# Patient Record
Sex: MALE | Race: WHITE | URBAN - METROPOLITAN AREA
[De-identification: names, ages, dates, MRNs, and addresses within clinical notes are randomized per-mention and may not be internally consistent; named-entity substitution may affect disease eponyms.]

---

## 2017-06-02 ENCOUNTER — TRANSFERRED RECORDS (OUTPATIENT)
Dept: HEALTH INFORMATION MANAGEMENT | Facility: CLINIC | Age: 62
End: 2017-06-02

## 2017-06-05 ENCOUNTER — TRANSFERRED RECORDS (OUTPATIENT)
Dept: HEALTH INFORMATION MANAGEMENT | Facility: CLINIC | Age: 62
End: 2017-06-05

## 2017-06-08 ENCOUNTER — TRANSFERRED RECORDS (OUTPATIENT)
Dept: HEALTH INFORMATION MANAGEMENT | Facility: CLINIC | Age: 62
End: 2017-06-08

## 2017-06-09 ENCOUNTER — TRANSFERRED RECORDS (OUTPATIENT)
Dept: HEALTH INFORMATION MANAGEMENT | Facility: CLINIC | Age: 62
End: 2017-06-09

## 2017-06-12 ENCOUNTER — TRANSFERRED RECORDS (OUTPATIENT)
Dept: HEALTH INFORMATION MANAGEMENT | Facility: CLINIC | Age: 62
End: 2017-06-12

## 2017-06-19 ENCOUNTER — TRANSFERRED RECORDS (OUTPATIENT)
Dept: HEALTH INFORMATION MANAGEMENT | Facility: CLINIC | Age: 62
End: 2017-06-19

## 2017-07-14 ENCOUNTER — TRANSFERRED RECORDS (OUTPATIENT)
Dept: HEALTH INFORMATION MANAGEMENT | Facility: CLINIC | Age: 62
End: 2017-07-14

## 2017-07-24 ENCOUNTER — TRANSFERRED RECORDS (OUTPATIENT)
Dept: HEALTH INFORMATION MANAGEMENT | Facility: CLINIC | Age: 62
End: 2017-07-24

## 2017-07-31 ENCOUNTER — TRANSFERRED RECORDS (OUTPATIENT)
Dept: HEALTH INFORMATION MANAGEMENT | Facility: CLINIC | Age: 62
End: 2017-07-31

## 2017-08-01 ENCOUNTER — TRANSFERRED RECORDS (OUTPATIENT)
Dept: HEALTH INFORMATION MANAGEMENT | Facility: CLINIC | Age: 62
End: 2017-08-01

## 2017-08-18 ENCOUNTER — TRANSFERRED RECORDS (OUTPATIENT)
Dept: HEALTH INFORMATION MANAGEMENT | Facility: CLINIC | Age: 62
End: 2017-08-18

## 2017-08-24 ENCOUNTER — TRANSFERRED RECORDS (OUTPATIENT)
Dept: HEALTH INFORMATION MANAGEMENT | Facility: CLINIC | Age: 62
End: 2017-08-24

## 2017-09-07 ENCOUNTER — TRANSFERRED RECORDS (OUTPATIENT)
Dept: HEALTH INFORMATION MANAGEMENT | Facility: CLINIC | Age: 62
End: 2017-09-07

## 2017-09-14 ENCOUNTER — TRANSFERRED RECORDS (OUTPATIENT)
Dept: HEALTH INFORMATION MANAGEMENT | Facility: CLINIC | Age: 62
End: 2017-09-14

## 2017-12-08 ENCOUNTER — TRANSFERRED RECORDS (OUTPATIENT)
Dept: HEALTH INFORMATION MANAGEMENT | Facility: CLINIC | Age: 62
End: 2017-12-08

## 2017-12-12 ENCOUNTER — TRANSFERRED RECORDS (OUTPATIENT)
Dept: HEALTH INFORMATION MANAGEMENT | Facility: CLINIC | Age: 62
End: 2017-12-12

## 2017-12-28 ENCOUNTER — TRANSFERRED RECORDS (OUTPATIENT)
Dept: HEALTH INFORMATION MANAGEMENT | Facility: CLINIC | Age: 62
End: 2017-12-28

## 2017-12-29 ENCOUNTER — TRANSFERRED RECORDS (OUTPATIENT)
Dept: HEALTH INFORMATION MANAGEMENT | Facility: CLINIC | Age: 62
End: 2017-12-29

## 2018-01-09 ENCOUNTER — TRANSFERRED RECORDS (OUTPATIENT)
Dept: HEALTH INFORMATION MANAGEMENT | Facility: CLINIC | Age: 63
End: 2018-01-09

## 2018-02-23 ENCOUNTER — TRANSFERRED RECORDS (OUTPATIENT)
Dept: HEALTH INFORMATION MANAGEMENT | Facility: CLINIC | Age: 63
End: 2018-02-23

## 2018-02-27 ENCOUNTER — TRANSFERRED RECORDS (OUTPATIENT)
Dept: HEALTH INFORMATION MANAGEMENT | Facility: CLINIC | Age: 63
End: 2018-02-27

## 2018-03-07 ENCOUNTER — TRANSFERRED RECORDS (OUTPATIENT)
Dept: HEALTH INFORMATION MANAGEMENT | Facility: CLINIC | Age: 63
End: 2018-03-07

## 2018-03-08 ENCOUNTER — TRANSFERRED RECORDS (OUTPATIENT)
Dept: HEALTH INFORMATION MANAGEMENT | Facility: CLINIC | Age: 63
End: 2018-03-08

## 2018-03-09 ENCOUNTER — TRANSFERRED RECORDS (OUTPATIENT)
Dept: HEALTH INFORMATION MANAGEMENT | Facility: CLINIC | Age: 63
End: 2018-03-09

## 2018-03-13 ENCOUNTER — PRE VISIT (OUTPATIENT)
Dept: UROLOGY | Facility: CLINIC | Age: 63
End: 2018-03-13

## 2018-03-14 ENCOUNTER — TRANSFERRED RECORDS (OUTPATIENT)
Dept: HEALTH INFORMATION MANAGEMENT | Facility: CLINIC | Age: 63
End: 2018-03-14

## 2018-03-23 ENCOUNTER — OFFICE VISIT (OUTPATIENT)
Dept: UROLOGY | Facility: CLINIC | Age: 63
End: 2018-03-23
Payer: COMMERCIAL

## 2018-03-23 VITALS
SYSTOLIC BLOOD PRESSURE: 146 MMHG | BODY MASS INDEX: 28.32 KG/M2 | DIASTOLIC BLOOD PRESSURE: 101 MMHG | HEART RATE: 82 BPM | HEIGHT: 65 IN | WEIGHT: 170 LBS

## 2018-03-23 DIAGNOSIS — C67.8 MALIGNANT NEOPLASM OF OVERLAPPING SITES OF BLADDER (H): Primary | ICD-10-CM

## 2018-03-23 RX ORDER — SULFAMETHOXAZOLE AND TRIMETHOPRIM 400; 80 MG/1; MG/1
TABLET ORAL
COMMUNITY
Start: 2017-07-31 | End: 2018-03-23

## 2018-03-23 RX ORDER — NICOTINE 21 MG/24HR
1 PATCH, TRANSDERMAL 24 HOURS TRANSDERMAL
COMMUNITY
End: 2018-03-23

## 2018-03-23 RX ORDER — ACETAMINOPHEN 500 MG
500-1000 TABLET ORAL
COMMUNITY

## 2018-03-23 ASSESSMENT — ENCOUNTER SYMPTOMS
DYSURIA: 1
HEMATURIA: 1
DIFFICULTY URINATING: 1
FLANK PAIN: 0

## 2018-03-23 ASSESSMENT — PAIN SCALES - GENERAL: PAINLEVEL: NO PAIN (0)

## 2018-03-23 NOTE — PATIENT INSTRUCTIONS
Schedule appointment for imaging.    Schedule surgery with Dr. Butt.     It was a pleasure meeting with you today.  Thank you for allowing me and my team the privilege of caring for you today.  YOU are the reason we are here, and I truly hope we provided you with the excellent service you deserve.  Please let us know if there is anything else we can do for you so that we can be sure you are leaving completely satisfied with your care experience.      ELIO Lorenzo

## 2018-03-23 NOTE — NURSING NOTE
Invasive Procedure Safety Checklist:    Procedure:     Action: Complete sections and checkboxes as appropriate.    Pre-procedure:  1. Patient ID Verified with 2 identifiers (Haritha and  or MRN) : YES    2. Procedure and site verified with patient/designee (when able) : YES    3. Accurate consent documentation in medical record : YES    4. H&P (or appropriate assessment) documented in medical record : YES  H&P must be up to 30 days prior to procedure an updated within 24 hours of                 Procedure as applicable.     5. Relevant diagnostic and radiology test results appropriately labeled and displayed as applicable : YES    6. Blood products, implants, devices, and/or special equipment available for the procedure as applicable : YES    7. Procedure site(s) marked with provider initials [Exclusions: N/A] : NO    8. Marking not required. Reason : Yes  Procedure does not require site marking    Time Out:     Time-Out performed immediately prior to starting procedure, including verbal and active participation of all team members addressing: YES    1. Correct patient identity.  2. Confirmed that the correct side and site are marked.  3. An accurate procedure to be done.  4. Agreement on the procedure to be done.  5. Correct patient position.  6. Relevant images and results are properly labeled and appropriately displayed.  7. The need to administer antibiotics or fluids for irrigation purposes during the procedure as applicable.  8. Safety precautions based on patient history or medication use.    During Procedure: Verification of correct person, site, and procedure occurs any time the responsibility for care of the patient is transferred to another member of the care team.    The following medication was given:      MEDICATION: Lidocaine Hydrochloride Jelly USP, 2%  ROUTE: Urethral meatus   SITE: urethra   DOSE: 10 mL  LOT #: [id number]  :  [employer]   EXPIRATION DATE:   10-19  NDC#: [address]-3

## 2018-03-23 NOTE — PROGRESS NOTES
Pre-procedure diagnosis: Bladder cancer  Post procedure diagnosis: abnormal cystoscopy  Procedure performed: cystoscopy  Surgeon: UGO Butt MD  Anesthesia: local    Indications for procedure: Patient is a 62 year old male with a history of [id number] cancer post TURx2 and /surveillance cysto    Description of procedure: After fully informed voluntary consent was obtained patient was brought into the procedure room, identified and placed in a supine position on the cysto table.  The groin/scrotum were prepped and draped in a sterile fashion with betadine.  A 15F flexible cystoscope was inserted into the urethra and the bladder and urethra examined in a systematic manner.  There was an area of necrotic debris over the dome at the site of previous resection.  Also had a mass like lesion protruding from the right side of the dome adjacent to the mass with smooth normal mucosa overlying this.  Corresponded to the region of mass seen on the CT scan.  There were a lot of fat globules floating in the bladder and what appeared to be a small hole between the protuberant mucosa and the necrotic crater of the resection bed.  No tumor stones or diverticula.  Ureteric orifices were normal in position and number and effluxing clear urine.  The prostate was 3 cm long and showed bilobar hypertrophy.  There was a small median lobe.  Distal urethra was normal.  The patient tolerated the procedure well and there were no complications.      Assessment/Plan: Advise MRI to better evaluate the bladder mass.  Would benefit from a biopsy of the mass using cold cup biopsy

## 2018-03-23 NOTE — PROGRESS NOTES
We are pleased to see Mr. Arcadio Thompson in consultation at the request of Dr. Zamarripa for the evaluation of chief complaint listed below    Chief Complaint:    Bladder cancer         History of Present Illness:   Arcadio Thompson is a(n) 62 year old male who is otherwise healthy except for a urologic history of HGT1 bladder cancer who presents with new finding of ulcer and extrinsic appearing mass on recent cystoscopy.  He initially presented with gross hematuria to Dr. Zamarripa in June 2017.  He underwent TURBT and was found to have HGT1 baldder cancer and received intravesical mytomycin post-op.  Restaging TURBT confirmed HGT1 disease.  He was initiated on BCG induction course and completed that without issue.  On initial surveillance cystoscopy, he was noted to have an ulcer at the bladder dome and non-papillary appearing deformity that was thought to be due to an extravesical compression/mass.  He underwent CTU, which showed at mass arising from the anterolateral left aspect of the bladder.    He underwent Urovysion cytology which was negative.           Past Medical History:   No past medical history on file.         Past Surgical History:   No past surgical history on file.  Appendectomy in 20s.       Social History:   Works as a manager, , no children  Smoking: quit beginning of 2018, 40 pack year history  Alcohol: denies  IV Drug Use: None         Family History:   No family history on file.  No urologic cancers in the family.         Allergies:   Not on File         Medications:     Current Outpatient Prescriptions   Medication Sig     sulfamethoxazole-trimethoprim (BACTRIM/SEPTRA) 400-80 MG per tablet      acetaminophen (TYLENOL) 500 MG tablet Take 500-1,000 mg by mouth     Cyanocobalamin 5000 MCG SUBL Take 500 mcg by mouth     Multiple Vitamin (THERAPEUTIC MULTIVITAMIN PO)      nicotine (NICODERM CQ) 21 MG/24HR 24 hr patch 1 patch     No current facility-administered medications for this visit.              REVIEW OF SYSTEMS:    See HPI for pertinent details.  Remainder of 10-point ROS negative.         PHYSICAL EXAM   There were no vitals taken for this visit.  GENERAL: No acute distress. Well nourished.   HEENT:  Sclerae anicteric.  Conjunctivae pink.  Moist mucous membranes.  NECK:  Supple.  No lymphadenopathy.  CARDIAC:  Regular rate and rhythm.  LUNGS:  Non-labored breathing  BACK:  No costovertebral tenderness.  ABDOMEN: Soft, non-tender, no organomegaly  EXTREMITIES:  Warm, well perfused.  No lower extremity edema bilaterally.  NEURO: normal gait, no focal deficits.           LABS AND IMAGING:   CT Urogram reviewed, notable for above.          ASSESSMENT:   Luiz Thompson is a 62 year old male with history of HGT1 who presents for evaluation of suspicious extrinsic appear mass on cysto and CTU imaging concerning for bladder wall mass.            PLAN:   - In office cystoscopy today  - MRI to better characterize the mass  - cold cup biopsy of mass next available  - Return to clinic after studies are complete    WILLARD Aguirre  Urology Resident    Patient was seen and examined with Dr. Finley    Patient seen and examined with the resident.  Visit time 30 minutes and >50% spent in counseling.  I agree with the resident's note and plan of care.       Conner Finley MD  Urology Staff      CC  Patient Care Team:  Katie Jackson as PCP - General (Nurse Practitioner)  CONNER FINLEY    Copy to patient  LUIZ THOMPSON  [id number] [facility] [address]  Answers for HPI/ROS submitted by the patient on 3/23/2018   General Symptoms: No  Skin Symptoms: No  HENT Symptoms: No  EYE SYMPTOMS: No  HEART SYMPTOMS: No  LUNG SYMPTOMS: No  INTESTINAL SYMPTOMS: No  URINARY SYMPTOMS: Yes  REPRODUCTIVE SYMPTOMS: No  SKELETAL SYMPTOMS: No  BLOOD SYMPTOMS: No  NERVOUS SYSTEM SYMPTOMS: No  MENTAL HEALTH SYMPTOMS: No  Trouble holding urine or incontinence: Yes  Pain or burning: Yes  Trouble starting or stopping:  No  Increased frequency of urination: Yes  Blood in urine: Yes  Decreased frequency of urination: Yes  Frequent nighttime urination: Yes  Flank pain: No  Difficulty emptying bladder: Yes

## 2018-03-23 NOTE — NURSING NOTE
"Chief Complaint   Patient presents with     Consult     New patient consult for bladder cancer.  Patient of Dr. Zamarripa.         Initial Ht 1.638 m (5' 4.5\")  Wt 77.1 kg (170 lb)  BMI 28.73 kg/m2 Estimated body mass index is 28.73 kg/(m^2) as calculated from the following:    Height as of this encounter: 1.638 m (5' 4.5\").    Weight as of this encounter: 77.1 kg (170 lb).  Medication Reconciliation: complete     ELIO Lorenzo    "

## 2018-03-23 NOTE — LETTER
3/23/2018       RE: Arcadio Thompson  [address]  [facility] [address]     Dear Colleague,    Thank you for referring your patient, Arcadio Thompson, to the [facility] UROLOGY AND INST FOR PROSTATE AND UROLOGIC CANCERS at [facility]. Please see a copy of my visit note below.    We are pleased to see Mr. Arcadio Thompson in consultation at the request of Dr. Zamarripa for the evaluation of chief complaint listed below    Chief Complaint:    Bladder cancer         History of Present Illness:   Arcadio Thompson is a(n) 62 year old male who is otherwise healthy except for a urologic history of HGT1 bladder cancer who presents with new finding of ulcer and extrinsic appearing mass on recent cystoscopy.  He initially presented with gross hematuria to Dr. Zamarripa in June 2017.  He underwent TURBT and was found to have HGT1 baldder cancer and received intravesical mytomycin post-op.  Restaging TURBT confirmed HGT1 disease.  He was initiated on BCG induction course and completed that without issue.  On initial surveillance cystoscopy, he was noted to have an ulcer at the bladder dome and non-papillary appearing deformity that was thought to be due to an extravesical compression/mass.  He underwent CTU, which showed at mass arising from the anterolateral left aspect of the bladder.    He underwent Urovysion cytology which was negative.           Past Medical History:   No past medical history on file.         Past Surgical History:   No past surgical history on file.  Appendectomy in 20s.       Social History:   Works as a manager, , no children  Smoking: quit beginning of 2018, 40 pack year history  Alcohol: denies  IV Drug Use: None         Family History:   No family history on file.  No urologic cancers in the family.         Allergies:   Not on File         Medications:     Current Outpatient Prescriptions   Medication Sig     sulfamethoxazole-trimethoprim (BACTRIM/SEPTRA)  400-80 MG per tablet      acetaminophen (TYLENOL) 500 MG tablet Take 500-1,000 mg by mouth     Cyanocobalamin 5000 MCG SUBL Take 500 mcg by mouth     Multiple Vitamin (THERAPEUTIC MULTIVITAMIN PO)      nicotine (NICODERM CQ) 21 MG/24HR 24 hr patch 1 patch     No current facility-administered medications for this visit.             REVIEW OF SYSTEMS:    See HPI for pertinent details.  Remainder of 10-point ROS negative.         PHYSICAL EXAM   There were no vitals taken for this visit.  GENERAL: No acute distress. Well nourished.   HEENT:  Sclerae anicteric.  Conjunctivae pink.  Moist mucous membranes.  NECK:  Supple.  No lymphadenopathy.  CARDIAC:  Regular rate and rhythm.  LUNGS:  Non-labored breathing  BACK:  No costovertebral tenderness.  ABDOMEN: Soft, non-tender, no organomegaly  EXTREMITIES:  Warm, well perfused.  No lower extremity edema bilaterally.  NEURO: normal gait, no focal deficits.           LABS AND IMAGING:   CT Urogram reviewed, notable for above.          ASSESSMENT:   Arcadio Thompson is a 62 year old male with history of HGT1 who presents for evaluation of suspicious extrinsic appear mass on cysto and CTU imaging concerning for bladder wall mass.            PLAN:   - In office cystoscopy today  - MRI to better characterize the mass  - cold cup biopsy of mass next available  - Return to clinic after studies are complete    Rony Garland, G2  Urology Resident    Patient was seen and examined with Dr. Butt    Pre-procedure diagnosis: Bladder cancer  Post procedure diagnosis: abnormal cystoscopy  Procedure performed: cystoscopy  Surgeon: UGO Butt MD  Anesthesia: local    Indications for procedure: Patient is a 62 year old male with a history of [id number] cancer post TURx2 and /surveillance cysto    Description of procedure: After fully informed voluntary consent was obtained patient was brought into the procedure room, identified and placed in a supine position on the cysto table.  The  groin/scrotum were prepped and draped in a sterile fashion with betadine.  A 15F flexible cystoscope was inserted into the urethra and the bladder and urethra examined in a systematic manner.  There was an area of necrotic debris over the dome at the site of previous resection.  Also had a mass like lesion protruding from the right side of the dome adjacent to the mass with smooth normal mucosa overlying this.  Corresponded to the region of mass seen on the CT scan.  There were a lot of fat globules floating in the bladder and what appeared to be a small hole between the protuberant mucosa and the necrotic crater of the resection bed.  No tumor stones or diverticula.  Ureteric orifices were normal in position and number and effluxing clear urine.  The prostate was 3 cm long and showed bilobar hypertrophy.  There was a small median lobe.  Distal urethra was normal.  The patient tolerated the procedure well and there were no complications.      Assessment/Plan: Advise MRI to better evaluate the bladder mass.  Would benefit from a biopsy of the mass using cold cup biopsy [id number]    Again, thank you for allowing me to participate in the care of your patient.      Sincerely,    Vy Finley MD    CC  Patient Care Team:  Katie Jackson as PCP - General (Nurse Practitioner)  VY FINLEY    Copy to patient  LUIZ PINEDA  [id number] [address]

## 2018-03-23 NOTE — MR AVS SNAPSHOT
After Visit Summary   3/23/2018    Arcadio Thompson    MRN: [id number]           Patient Information     Date Of Birth          1955        Visit Information        Provider Department      3/23/2018 3:00 PM Conner Butt MD [facility] Urology and [facility] for Prostate and Urologic Cancers        Today's Diagnoses     Malignant neoplasm of overlapping sites of bladder (H)    -  1      Care Instructions    Schedule appointment for imaging.    Schedule surgery with Dr. Butt.     It was a pleasure meeting with you today.  Thank you for allowing me and my team the privilege of caring for you today.  YOU are the reason we are here, and I truly hope we provided you with the excellent service you deserve.  Please let us know if there is anything else we can do for you so that we can be sure you are leaving completely satisfied with your care experience.      Astrid Deleon, [facility]          Follow-ups after your visit        Additional Services     PAC Visit Referral (For [facility] Only)       Does this visit require an Anesthesia consult?  Yes - Evaluate for medical necessity related to one of the following conditions:      H&P done by:  N/A and Other (Specify): PAC      Please be aware that coverage of these services is subject to the terms and limitations of your health insurance plan.  Call member services at your health plan with any benefit or coverage questions.      Please bring the following to your appointment:  >>   Any x-rays, CTs or MRIs which have been performed.  Contact the facility where they were done to arrange for  prior to your scheduled appointment.  Any new CT, MRI or other procedures ordered by your specialist must be performed at a [place] facility or coordinated by your clinic's referral office.    >>   List of current medications  >>   This referral request   >>   Any documents/labs given to you for this referral                  Your next 10 appointments already scheduled      2018   Procedure with Conner Butt MD   [facility] ([facility] [place])    [address] [place] [address]  5th [facility] [id number] [phone]           Located in the Clinics and Surgery Center at [address] [place] [address].   parking is very convenient and highly recommended.  is a $6 flat rate fee.  Both  and self parkers should enter the main arrival plaza from [facility]; parking attendants will direct you based on your parking preference.            May 09, 2018  2:00 PM CDT   (Arrive by 1:45 PM)   Post-Op with Conner Butt MD   [facility] ([facility] [place])    [address] [place] [address]  4th [facility] [address] [phone]              Who to contact     Please call your clinic at [phone] to:    Ask questions about your health    Make or cancel appointments    Discuss your medicines    Learn about your test results    Speak to your doctor            Additional Information About Your Visit        [employer] Information     [employer] is an electronic gateway that provides easy, online access to your medical records. With [employer], you can request a clinic appointment, read your test results, renew a prescription or communicate with your care team.     To sign up for [employer] visit the website at [email or web address]   You will be asked to enter the access code listed below, as well as some personal information. Please follow the directions to create your username and password.     Your access code is: [email or web address]  Expires: 6/10/2018  6:30 AM     Your access code will  in 90 days. If you need help or a new code, please contact your [facility] or call [phone] for assistance.        Care EveryWhere ID     This is your Care EveryWhere ID. This could be used by other organizations to  "access your [facility] medical records  [id number]        Your Vitals Were     Pulse Height BMI (Body Mass Index)             82 1.638 m (5' 4.5\") 28.73 kg/m2          Blood Pressure from Last 3 Encounters:   No data found for BP    Weight from Last 3 Encounters:   No data found for Wt              Today, you had the following     No orders found for display         Today's Medication Changes          These changes are accurate as of 3/23/18 11:59 PM.  If you have any questions, ask your nurse or doctor.               Stop taking these medicines if you haven't already. Please contact your care team if you have questions.     Cyanocobalamin 5000 MCG Subl   Stopped by:  Conner Butt MD           nicotine 21 MG/24HR 24 hr patch   Commonly known as:  NICODERM CQ   Stopped by:  Conner Butt MD           sulfamethoxazole-trimethoprim 400-80 MG per tablet   Commonly known as:  BACTRIM/SEPTRA   Stopped by:  Conner Butt MD                    Primary Care Provider Office Phone # Fax #    Katie ADELA [facility] [phone]       [place] [address] [facility] [address]        Equal Access to Services     [facility] AH: Hadii aad ku hadasho Soomaali, waaxda luqadaha, qaybta kaalmada adeegyada, waxay idiin hayaan adeeg kharash la'aan ah. So [facility] [phone].    ATENCIÓN: Si habla español, tiene a castillo disposición servicios gratuitos de asistencia lingüística. Llame al [phone].    We comply with applicable federal civil rights laws and [place] laws. We do not discriminate on the basis of race, color, national origin, age, disability, sex, sexual orientation, or gender identity.            Thank you!     Thank you for choosing [facility] UROLOGY AND [facility] FOR PROSTATE AND UROLOGIC CANCERS  for your care. Our goal is always to provide you with excellent care. Hearing back from our patients is one way we can continue to improve our services. Please take a few minutes to complete the " written survey that you may receive in the mail after your visit with us. Thank you!             Your Updated Medication List - Protect others around you: Learn how to safely use, store and throw away your medicines at www.disposemymeds.org.          This list is accurate as of 3/23/18 11:59 PM.  Always use your most recent med list.                   Brand Name Dispense Instructions for use Diagnosis    acetaminophen 500 MG tablet    TYLENOL     Take 500-1,000 mg by mouth        THERAPEUTIC MULTIVITAMIN PO

## 2018-03-26 ENCOUNTER — HOSPITAL ENCOUNTER (OUTPATIENT)
Facility: AMBULATORY SURGERY CENTER | Age: 63
End: 2018-03-26
Attending: UROLOGY
Payer: COMMERCIAL

## 2018-03-26 ENCOUNTER — TELEPHONE (OUTPATIENT)
Dept: UROLOGY | Facility: CLINIC | Age: 63
End: 2018-03-26

## 2018-03-26 NOTE — TELEPHONE ENCOUNTER
Spoke to the patient surgery scheduled for 04/13/18 at 9:00am with a 7:30am check in. Post-op appt scheduled for 05/09/18. Patient is wanting to get his MRI scheduled closer to home so the hospital information was sent to  nurse. [facility] [phone]. Surgery packet being mailed out today. Patient is aware he will need a pre-op and plans on scheduling that this week.

## 2018-04-03 ENCOUNTER — TRANSFERRED RECORDS (OUTPATIENT)
Dept: HEALTH INFORMATION MANAGEMENT | Facility: CLINIC | Age: 63
End: 2018-04-03

## 2018-04-04 ENCOUNTER — TRANSFERRED RECORDS (OUTPATIENT)
Dept: HEALTH INFORMATION MANAGEMENT | Facility: CLINIC | Age: 63
End: 2018-04-04

## 2018-04-12 ENCOUNTER — ANESTHESIA EVENT (OUTPATIENT)
Dept: SURGERY | Facility: CLINIC | Age: 63
End: 2018-04-12
Payer: COMMERCIAL

## 2018-04-13 ENCOUNTER — SURGERY (OUTPATIENT)
Age: 63
End: 2018-04-13

## 2018-04-13 ENCOUNTER — HOSPITAL ENCOUNTER (OUTPATIENT)
Facility: CLINIC | Age: 63
Discharge: HOME | End: 2018-04-13
Attending: UROLOGY | Admitting: UROLOGY
Payer: COMMERCIAL

## 2018-04-13 ENCOUNTER — ANESTHESIA (OUTPATIENT)
Dept: SURGERY | Facility: CLINIC | Age: 63
End: 2018-04-13
Payer: COMMERCIAL

## 2018-04-13 VITALS
RESPIRATION RATE: 16 BRPM | HEIGHT: 65 IN | DIASTOLIC BLOOD PRESSURE: 92 MMHG | OXYGEN SATURATION: 97 % | WEIGHT: 170.19 LBS | BODY MASS INDEX: 28.36 KG/M2 | SYSTOLIC BLOOD PRESSURE: 150 MMHG | HEART RATE: 87 BPM | TEMPERATURE: 97.6 F

## 2018-04-13 DIAGNOSIS — C67.8 MALIGNANT NEOPLASM OF OVERLAPPING SITES OF BLADDER (H): ICD-10-CM

## 2018-04-13 LAB — GLUCOSE BLDC GLUCOMTR-MCNC: 107 MG/DL (ref 70–99)

## 2018-04-13 PROCEDURE — 25000128 H RX IP 250 OP 636: Performed by: UROLOGY

## 2018-04-13 PROCEDURE — 27210794 ZZH OR GENERAL SUPPLY STERILE: Performed by: UROLOGY

## 2018-04-13 PROCEDURE — 25000128 H RX IP 250 OP 636: Performed by: NURSE ANESTHETIST, CERTIFIED REGISTERED

## 2018-04-13 PROCEDURE — 36000059 ZZH SURGERY LEVEL 3 EA 15 ADDTL MIN UMMC: Performed by: UROLOGY

## 2018-04-13 PROCEDURE — 82962 GLUCOSE BLOOD TEST: CPT

## 2018-04-13 PROCEDURE — 71000027 ZZH RECOVERY PHASE 2 EACH 15 MINS: Performed by: UROLOGY

## 2018-04-13 PROCEDURE — 25000132 ZZH RX MED GY IP 250 OP 250 PS 637: Performed by: STUDENT IN AN ORGANIZED HEALTH CARE EDUCATION/TRAINING PROGRAM

## 2018-04-13 PROCEDURE — 37000008 ZZH ANESTHESIA TECHNICAL FEE, 1ST 30 MIN: Performed by: UROLOGY

## 2018-04-13 PROCEDURE — 25000125 ZZHC RX 250: Performed by: NURSE ANESTHETIST, CERTIFIED REGISTERED

## 2018-04-13 PROCEDURE — 25000128 H RX IP 250 OP 636: Performed by: ANESTHESIOLOGY

## 2018-04-13 PROCEDURE — 88305 TISSUE EXAM BY PATHOLOGIST: CPT | Performed by: UROLOGY

## 2018-04-13 PROCEDURE — 25000566 ZZH SEVOFLURANE, EA 15 MIN: Performed by: UROLOGY

## 2018-04-13 PROCEDURE — C9275 HEXAMINOLEVULINATE HCL: HCPCS | Performed by: UROLOGY

## 2018-04-13 PROCEDURE — 40000170 ZZH STATISTIC PRE-PROCEDURE ASSESSMENT II: Performed by: UROLOGY

## 2018-04-13 PROCEDURE — 36000057 ZZH SURGERY LEVEL 3 1ST 30 MIN - UMMC: Performed by: UROLOGY

## 2018-04-13 PROCEDURE — 71000014 ZZH RECOVERY PHASE 1 LEVEL 2 FIRST HR: Performed by: UROLOGY

## 2018-04-13 PROCEDURE — 37000009 ZZH ANESTHESIA TECHNICAL FEE, EACH ADDTL 15 MIN: Performed by: UROLOGY

## 2018-04-13 RX ORDER — FENTANYL CITRATE 50 UG/ML
25-50 INJECTION, SOLUTION INTRAMUSCULAR; INTRAVENOUS
Status: DISCONTINUED | OUTPATIENT
Start: 2018-04-13 | End: 2018-04-13 | Stop reason: HOSPADM

## 2018-04-13 RX ORDER — GABAPENTIN 300 MG/1
300 CAPSULE ORAL ONCE
Status: COMPLETED | OUTPATIENT
Start: 2018-04-13 | End: 2018-04-13

## 2018-04-13 RX ORDER — HYDRALAZINE HYDROCHLORIDE 20 MG/ML
2.5-5 INJECTION INTRAMUSCULAR; INTRAVENOUS EVERY 10 MIN PRN
Status: DISCONTINUED | OUTPATIENT
Start: 2018-04-13 | End: 2018-04-13 | Stop reason: HOSPADM

## 2018-04-13 RX ORDER — ACETAMINOPHEN 325 MG/1
975 TABLET ORAL ONCE
Status: COMPLETED | OUTPATIENT
Start: 2018-04-13 | End: 2018-04-13

## 2018-04-13 RX ORDER — SODIUM CHLORIDE, SODIUM LACTATE, POTASSIUM CHLORIDE, CALCIUM CHLORIDE 600; 310; 30; 20 MG/100ML; MG/100ML; MG/100ML; MG/100ML
INJECTION, SOLUTION INTRAVENOUS CONTINUOUS
Status: DISCONTINUED | OUTPATIENT
Start: 2018-04-13 | End: 2018-04-13 | Stop reason: HOSPADM

## 2018-04-13 RX ORDER — CEFAZOLIN SODIUM 2 G/100ML
2 INJECTION, SOLUTION INTRAVENOUS
Status: COMPLETED | OUTPATIENT
Start: 2018-04-13 | End: 2018-04-13

## 2018-04-13 RX ORDER — LIDOCAINE HYDROCHLORIDE 20 MG/ML
INJECTION, SOLUTION INFILTRATION; PERINEURAL PRN
Status: DISCONTINUED | OUTPATIENT
Start: 2018-04-13 | End: 2018-04-13

## 2018-04-13 RX ORDER — LIDOCAINE 40 MG/G
CREAM TOPICAL
Status: DISCONTINUED | OUTPATIENT
Start: 2018-04-13 | End: 2018-04-13 | Stop reason: HOSPADM

## 2018-04-13 RX ORDER — NALOXONE HYDROCHLORIDE 0.4 MG/ML
.1-.4 INJECTION, SOLUTION INTRAMUSCULAR; INTRAVENOUS; SUBCUTANEOUS
Status: DISCONTINUED | OUTPATIENT
Start: 2018-04-13 | End: 2018-04-13 | Stop reason: HOSPADM

## 2018-04-13 RX ORDER — ONDANSETRON 2 MG/ML
INJECTION INTRAMUSCULAR; INTRAVENOUS PRN
Status: DISCONTINUED | OUTPATIENT
Start: 2018-04-13 | End: 2018-04-13

## 2018-04-13 RX ORDER — HYDROMORPHONE HYDROCHLORIDE 1 MG/ML
.3-.5 INJECTION, SOLUTION INTRAMUSCULAR; INTRAVENOUS; SUBCUTANEOUS EVERY 10 MIN PRN
Status: DISCONTINUED | OUTPATIENT
Start: 2018-04-13 | End: 2018-04-13 | Stop reason: HOSPADM

## 2018-04-13 RX ORDER — CEFAZOLIN SODIUM 1 G/3ML
1 INJECTION, POWDER, FOR SOLUTION INTRAMUSCULAR; INTRAVENOUS SEE ADMIN INSTRUCTIONS
Status: DISCONTINUED | OUTPATIENT
Start: 2018-04-13 | End: 2018-04-13 | Stop reason: HOSPADM

## 2018-04-13 RX ORDER — ONDANSETRON 4 MG/1
4 TABLET, ORALLY DISINTEGRATING ORAL EVERY 30 MIN PRN
Status: DISCONTINUED | OUTPATIENT
Start: 2018-04-13 | End: 2018-04-13 | Stop reason: HOSPADM

## 2018-04-13 RX ORDER — PROPOFOL 10 MG/ML
INJECTION, EMULSION INTRAVENOUS PRN
Status: DISCONTINUED | OUTPATIENT
Start: 2018-04-13 | End: 2018-04-13

## 2018-04-13 RX ORDER — OXYCODONE HYDROCHLORIDE 5 MG/1
5 TABLET ORAL EVERY 4 HOURS PRN
Status: DISCONTINUED | OUTPATIENT
Start: 2018-04-13 | End: 2018-04-13 | Stop reason: HOSPADM

## 2018-04-13 RX ORDER — CIPROFLOXACIN 500 MG/1
500 TABLET, FILM COATED ORAL 2 TIMES DAILY
Qty: 6 TABLET | Refills: 0 | Status: SHIPPED | OUTPATIENT
Start: 2018-04-13 | End: 2018-04-16

## 2018-04-13 RX ORDER — FENTANYL CITRATE 50 UG/ML
INJECTION, SOLUTION INTRAMUSCULAR; INTRAVENOUS PRN
Status: DISCONTINUED | OUTPATIENT
Start: 2018-04-13 | End: 2018-04-13

## 2018-04-13 RX ORDER — LABETALOL HYDROCHLORIDE 5 MG/ML
10 INJECTION, SOLUTION INTRAVENOUS
Status: DISCONTINUED | OUTPATIENT
Start: 2018-04-13 | End: 2018-04-13 | Stop reason: HOSPADM

## 2018-04-13 RX ORDER — ONDANSETRON 2 MG/ML
4 INJECTION INTRAMUSCULAR; INTRAVENOUS EVERY 30 MIN PRN
Status: DISCONTINUED | OUTPATIENT
Start: 2018-04-13 | End: 2018-04-13 | Stop reason: HOSPADM

## 2018-04-13 RX ORDER — MEPERIDINE HYDROCHLORIDE 50 MG/ML
12.5 INJECTION INTRAMUSCULAR; INTRAVENOUS; SUBCUTANEOUS
Status: DISCONTINUED | OUTPATIENT
Start: 2018-04-13 | End: 2018-04-13 | Stop reason: HOSPADM

## 2018-04-13 RX ADMIN — FENTANYL CITRATE 25 MCG: 50 INJECTION, SOLUTION INTRAMUSCULAR; INTRAVENOUS at 14:02

## 2018-04-13 RX ADMIN — HEXAMINOLEVULINATE HYDROCHLORIDE 100 MG: KIT at 12:30

## 2018-04-13 RX ADMIN — LIDOCAINE HYDROCHLORIDE 100 MG: 20 INJECTION, SOLUTION INFILTRATION; PERINEURAL at 13:49

## 2018-04-13 RX ADMIN — SODIUM CHLORIDE, POTASSIUM CHLORIDE, SODIUM LACTATE AND CALCIUM CHLORIDE: 600; 310; 30; 20 INJECTION, SOLUTION INTRAVENOUS at 13:36

## 2018-04-13 RX ADMIN — FENTANYL CITRATE 50 MCG: 50 INJECTION, SOLUTION INTRAMUSCULAR; INTRAVENOUS at 14:26

## 2018-04-13 RX ADMIN — MIDAZOLAM 2 MG: 1 INJECTION INTRAMUSCULAR; INTRAVENOUS at 13:36

## 2018-04-13 RX ADMIN — ONDANSETRON 4 MG: 2 INJECTION INTRAMUSCULAR; INTRAVENOUS at 14:31

## 2018-04-13 RX ADMIN — PROPOFOL 200 MG: 10 INJECTION, EMULSION INTRAVENOUS at 13:49

## 2018-04-13 RX ADMIN — ACETAMINOPHEN 975 MG: 325 TABLET, FILM COATED ORAL at 12:20

## 2018-04-13 RX ADMIN — CEFAZOLIN SODIUM 2 G: 2 INJECTION, SOLUTION INTRAVENOUS at 13:57

## 2018-04-13 RX ADMIN — GABAPENTIN 300 MG: 300 CAPSULE ORAL at 12:20

## 2018-04-13 ASSESSMENT — LIFESTYLE VARIABLES: TOBACCO_USE: 1

## 2018-04-13 NOTE — ANESTHESIA POSTPROCEDURE EVALUATION
Patient: Arcadio Thompson    Procedure(s):  Cystoscopy, Bladder Biopsy with Cysview, Fulguration - Wound Class: II-Clean Contaminated    Diagnosis:Bladder Cancer   Diagnosis Additional Information: No value filed.    Anesthesia Type:  General, LMA    Note:  Anesthesia Post Evaluation    Patient location during evaluation: PACU and Bedside  Patient participation: Able to fully participate in evaluation  Level of consciousness: awake and alert  Pain management: adequate  Airway patency: patent  Cardiovascular status: acceptable  Respiratory status: acceptable  Hydration status: acceptable  PONV: none     Anesthetic complications: None          Last vitals:  Vitals:    04/13/18 1445 04/13/18 1500 04/13/18 1515   BP: 143/89 (!) 145/91 140/70   Pulse:      Resp: 16 14 18   Temp: 36.3  C (97.3  F) 36.8  C (98.3  F)    SpO2: 99% 96% 94%         Electronically Signed By: Azeb Mcdaniels MD  April 13, 2018  3:28 PM

## 2018-04-13 NOTE — ANESTHESIA CARE TRANSFER NOTE
Patient: Arcadio Thompson    Procedure(s):  Cystoscopy, Bladder Biopsy with Cysview, Fulguration - Wound Class: II-Clean Contaminated    Diagnosis: Bladder Cancer   Diagnosis Additional Information: No value filed.    Anesthesia Type:   General, LMA     Note:  Airway :Face Mask  Patient transferred to:PACU  Handoff Report: Identifed the Patient, Identified the Reponsible Provider, Reviewed the pertinent medical history, Discussed the surgical course, Reviewed Intra-OP anesthesia mangement and issues during anesthesia, Set expectations for post-procedure period and Allowed opportunity for questions and acknowledgement of understanding      Vitals: (Last set prior to Anesthesia Care Transfer)    CRNA VITALS  4/13/2018 1414 - 4/13/2018 1445      4/13/2018             Resp Rate (observed): 16                Electronically Signed By: VINICIO Mixon CRNA  April 13, 2018  2:45 PM

## 2018-04-13 NOTE — IP AVS SNAPSHOT
MRN:[id number]                      After Visit Summary   4/13/2018    Arcadio Thompson    MRN: [id number]           Thank you!     Thank you for choosing [facility] for your care. Our goal is always to provide you with excellent care. Hearing back from our patients is one way we can continue to improve our services. Please take a few minutes to complete the written survey that you may receive in the mail after you visit with us. Thank you!        Patient Information     Date Of Birth          1955        About your hospital stay     You were admitted on:  April 13, 2018 You last received care in the:  Post Anesthesia Care Unit [facility]    You were discharged on:  April 13, 2018       Who to Call     For medical emergencies, please call 911.  For non-urgent questions about your medical care, please call your primary care provider or clinic, [phone]  For questions related to your surgery, please call your surgery clinic        Attending Provider     Provider Specialty    Conner Butt MD Urology       Primary Care Provider Office Phone # Fax #    Katie CHAPMAN [facility] [phone]      After Care Instructions     Discharge Instructions       Activity  - No strenuous exercise for 6 weeks.  - No lifting, pushing, pulling more than 10 pounds for 6 weeks.   - Do not strain with bowel movements.  - Do not drive until you can press the brake pedal quickly and fully without pain.   - Do not operate a motor vehicle while taking narcotic pain medications.     Medications  - Transition from narcotic pain medications to tylenol (acetaminophen) as you are able.  Wean yourself off all pain medications as you are able.  - Some pain medications contain both tylenol (acetaminophen) and a narcotic (Norco, vicodin, percocet), do not take more than 4,000mg of Tylenol (acetaminophen) from all sources in any 24 hour period.  - Narcotics can make you constipated.  Take over the counter fiber  "(metamucil or benefiber) and stool softeners (miralax, docusate or senna) while taking narcotic pain medications, but stop if you develop diarrhea.  - No driving or operating machinery while taking narcotic pain medications     Follow-Up:  - Call your primary care provider to touch base regarding your recent admission.    - Call or return sooner than your regularly scheduled visit if you develop any of the following: fever (greater than 101.5), uncontrolled pain, uncontrolled nausea or vomiting, as well as increased redness, swelling, or drainage from your wound.     Phone numbers:   - Monday through Friday 8am to 4:30pm: Call [phone] with questions or to schedule or confirm appointment.    - Nights or weekends: call the after hours emergency pager - [phone] and tell the  \"I would like to page the Urology Resident on call.\"  - For emergencies, call 911                  Your next 10 appointments already scheduled     May 09, 2018  2:00 PM CDT   (Arrive by 1:45 PM)   Post-Op with Conner Butt MD   [facility] ([facility])    [address] [place] [id number] [phone]              Further instructions from your care team       REMEMBER: SAME DAY SURGERY IS NOT SAME DAY RECOVERY. TAKE IT EASY, GET PLENTY OF REST, AND HAVE SOMEONE WITH YOU FOR 24 HOURS. FOLLOW THESE INSTRUCTIONS AND PLEASE DO NOT HESITATE TO CALL WITH ANY QUESTIONS.  [facility], [place]  Same-Day Surgery   Adult Discharge Orders & Instructions     For 24 hours after surgery    1. Get plenty of rest.  A responsible adult must stay with you for at least 24 hours after you leave the hospital.   2. Do not drive or use heavy equipment.  If you have weakness or tingling, don't drive or use heavy equipment until this feeling goes away.  3. Do not drink alcohol.  4. Avoid strenuous or risky activities.  Ask " "for help when climbing stairs.   5. You may feel lightheaded.  IF so, sit for a few minutes before standing.  Have someone help you get up.   6. If you have nausea (feel sick to your stomach): Drink only clear liquids such as apple juice, ginger ale, broth or 7-Up.  Rest may also help.  Be sure to drink enough fluids.  Move to a regular diet as you feel able.  7. You may have a slight fever. Call the doctor if your fever is over 100 F (37.7 C) (taken under the tongue) or lasts longer than 24 hours.  8. You may have a dry mouth, a sore throat, muscle aches or trouble sleeping.  These should go away after 24 hours.  9. Do not make important or legal decisions.   Call your doctor for any of the followin.  Signs of infection (fever, growing tenderness at the surgery site, a large amount of drainage or bleeding, severe pain, foul-smelling drainage, redness, swelling).    2. It has been over 8 to 10 hours since surgery and you are still not able to urinate (pass water).    3.  Headache for over 24 hours.    To contact a doctor, call Dr Butt's office at [phone], 8 am to 4:30 pm or:        [phone] and ask for the resident on call for Urology (answered 24 hours a day)      Emergency Department:    [facility]: [phone]       (TTY for hearing impaired: [phone])             Pending Results     Date and Time Order Name Status Description    2018 1421 Surgical pathology exam In process             Admission Information     Date & Time Provider Department Dept. Phone    2018 Conner Butt MD Post Anesthesia Care Unit [facility] [phone]      Your Vitals Were     Blood Pressure Pulse Temperature Respirations Height Weight    145/91 87 98.3  F (36.8  C) (Oral) 14 1.651 m (5' 5\") 77.2 kg (170 lb 3.1 oz)    Pulse Oximetry BMI (Body Mass Index)                96% 28.32 kg/m2          MyChart Information     [facility] lets you send messages to your doctor, view your test results, " "renew your prescriptions, schedule appointments and more. To sign up, go to [email or web address] . Click on \"Log in\" on the left side of the screen, which will take you to the Welcome page. Then click on \"Sign up Now\" on the right side of the page.     You will be asked to enter the access code listed below, as well as some personal information. Please follow the directions to create your username and password.     Your access code is: [email or web address]  Expires: 6/10/2018  6:30 AM     Your access code will  in 90 days. If you need help or a new code, please call your [facility] clinic or [phone].        Care EveryWhere ID     This is your Care EveryWhere ID. This could be used by other organizations to access your [facility] medical records  [id number]        Equal Access to Services     OVI VILLALTA : Cruzito Ribeiro, anabel mckeon, rizwana garza, maya almodovar . [facility] [phone].    ATENCIÓN: Si habla español, tiene a castillo disposición servicios gratuitos de asistencia lingüística. Loli al [phone].    We comply with applicable federal civil rights laws and [place] laws. We do not discriminate on the basis of race, color, national origin, age, disability, sex, sexual orientation, or gender identity.               Review of your medicines      START taking        Dose / Directions    ciprofloxacin 500 MG tablet   Commonly known as:  CIPRO   Used for:  Malignant neoplasm of overlapping sites of bladder (H)        Dose:  500 mg   Take 1 tablet (500 mg) by mouth 2 times daily for 3 days   Quantity:  6 tablet   Refills:  0       phenazopyridine 97.5 MG tablet   Commonly known as:  AZO   Used for:  Malignant neoplasm of overlapping sites of bladder (H)        Dose:  195 mg   Take 2 tablets (195 mg) by mouth 3 times daily   Quantity:  12 tablet   Refills:  0         CONTINUE these medicines which have NOT CHANGED        Dose / Directions    " acetaminophen 500 MG tablet   Commonly known as:  TYLENOL        Dose:  500-1000 mg   Take 500-1,000 mg by mouth   Refills:  0       THERAPEUTIC MULTIVITAMIN PO        Refills:  0            Where to get your medicines      These medications were sent to [facility] - [place], [place] - [address]  [address], [facility] [address]     Phone:  [phone]     ciprofloxacin 500 MG tablet    phenazopyridine 97.5 MG tablet                Protect others around you: Learn how to safely use, store and throw away your medicines at www.disposemymeds.org.        ANTIBIOTIC INSTRUCTION     You've Been Prescribed an Antibiotic - Now What?  Your healthcare team thinks that you or your loved one might have an infection. Some infections can be treated with antibiotics, which are powerful, life-saving drugs. Like all medications, antibiotics have side effects and should only be used when necessary. There are some important things you should know about your antibiotic treatment.      Your healthcare team may run tests before you start taking an antibiotic.    Your team may take samples (e.g., from your blood, urine or other areas) to run tests to look for bacteria. These test can be important to determine if you need an antibiotic at all and, if you do, which antibiotic will work best.      Within a few days, your healthcare team might change or even stop your antibiotic.    Your team may start you on an antibiotic while they are working to find out what is making you sick.    Your team might change your antibiotic because test results show that a different antibiotic would be better to treat your infection.    In some cases, once your team has more information, they learn that you do not need an antibiotic at all. They may find out that you don't have an infection, or that the antibiotic you're taking won't work against your infection. For example, an infection caused by a virus can't be treated  with antibiotics. Staying on an antibiotic when you don't need it is more likely to be harmful than helpful.      You may experience side effects from your antibiotic.    Like all medications, antibiotics have side effects. Some of these can be serious.    Let you healthcare team know if you have any known allergies when you are admitted to the hospital.    One significant side effect of nearly all antibiotics is the risk of severe and sometimes deadly diarrhea caused by Clostridium difficile (C. Difficile). This occurs when a person takes antibiotics because some good germs are destroyed. Antibiotic use allows C. diificile to take over, putting patients at high risk for this serious infection.    As a patient or caregiver, it is important to understand your or your loved one's antibiotic treatment. It is especially important for caregivers to speak up when patients can't speak for themselves. Here are some important questions to ask your healthcare team.    What infection is this antibiotic treating and how do you know I have that infection?    What side effects might occur from this antibiotic?    How long will I need to take this antibiotic?    Is it safe to take this antibiotic with other medications or supplements (e.g., vitamins) that I am taking?     Are there any special directions I need to know about taking this antibiotic? For example, should I take it with food?    How will I be monitored to know whether my infection is responding to the antibiotic?    What tests may help to make sure the right antibiotic is prescribed for me?      Information provided by:  www.cdc.gov/getsmart  U.S. Department of Health and Human Services  Centers for disease Control and Prevention  National Center for Emerging and Zoonotic Infectious Diseases  Division of Healthcare Quality Promotion             Medication List: This is a list of all your medications and when to take them. Check marks below indicate your daily home  schedule. Keep this list as a reference.      Medications           Morning Afternoon Evening Bedtime As Needed    acetaminophen 500 MG tablet   Commonly known as:  TYLENOL   Take 500-1,000 mg by mouth   Last time this was given:  975 mg on 4/13/2018 12:20 PM                                ciprofloxacin 500 MG tablet   Commonly known as:  CIPRO   Take 1 tablet (500 mg) by mouth 2 times daily for 3 days                                phenazopyridine 97.5 MG tablet   Commonly known as:  AZO   Take 2 tablets (195 mg) by mouth 3 times daily                                THERAPEUTIC MULTIVITAMIN PO

## 2018-04-13 NOTE — OR NURSING
Discharge instructions to pt and responsible adult.  All questions regarding discharge information answered.  Pt and responsible adult verbalized understanding of all discharge instruction information given. Prescriptions picked up from discharge pharmacy. Dr. Butt saw patient prior to discharge.

## 2018-04-13 NOTE — IP AVS SNAPSHOT
Post Anesthesia Care Unit [address] [address]    Phone:  [phone]                                       After Visit Summary   4/13/2018    Arcadio Thompson    MRN: [id number]           After Visit Summary Signature Page     I have received my discharge instructions, and my questions have been answered. I have discussed any challenges I see with this plan with the nurse or doctor.    ..........................................................................................................................................  Patient/Patient Representative Signature      ..........................................................................................................................................  Patient Representative Print Name and Relationship to Patient    ..................................................               ................................................  Date                                            Time    ..........................................................................................................................................  Reviewed by Signature/Title    ...................................................              ..............................................  Date                                                            Time

## 2018-04-13 NOTE — ANESTHESIA PREPROCEDURE EVALUATION
Anesthesia Evaluation     . Pt has had prior anesthetic. Type: General           ROS/MED HX    ENT/Pulmonary:  - neg pulmonary ROS   (+)tobacco use, Past use , . .    Neurologic:  - neg neurologic ROS     Cardiovascular:  - neg cardiovascular ROS      Pacemaker: 40 pk/year, d/c 2018.   METS/Exercise Tolerance:     Hematologic:  - neg hematologic  ROS       Musculoskeletal:  - neg musculoskeletal ROS       GI/Hepatic:  - neg GI/hepatic ROS       Renal/Genitourinary:         Endo:  - neg endo ROS       Psychiatric:  - neg psychiatric ROS       Infectious Disease:  - neg infectious disease ROS       Malignancy:   (+) Malignancy History of Other  Other CA Bladder CA status post Surgery         Other:                     Physical Exam  Normal systems: cardiovascular, pulmonary and dental    Airway   Mallampati: II  TM distance: >3 FB  Neck ROM: full    Dental     Cardiovascular       Pulmonary                     Anesthesia Plan      History & Physical Review  History and physical reviewed and following examination; no interval change.    ASA Status:  2 .        Plan for General and LMA with Intravenous and Propofol induction.   PONV prophylaxis:  Ondansetron (or other 5HT-3)       Postoperative Care  Postoperative pain management:  Multi-modal analgesia.      Consents  Anesthetic plan, risks, benefits and alternatives discussed with:  Patient.  Use of blood products discussed: No .   .        ANESTHESIA PREOP EVALUATION    Procedure: Procedure(s):  Cystoscopy, Bladder Biopsy with Cysview - Wound Class:     HPI: Arcadio Thompson is a 62 year old male presenting for above procedure given a medical history significant for bladder CA.    PMHx/PSHx/ROS:  No past medical history on file.    Past Surgical History:   Procedure Laterality Date     APPENDECTOMY       removal of bladder tumor      x 2         Past Anes Hx: No personal or family h/o anesthesia problems    Soc Hx:   Social History   Substance Use Topics      Smoking status: Former Smoker     Smokeless tobacco: Former User     Alcohol use Yes      Comment: rarely       Allergies: No Known Allergies    Meds:   No prescriptions prior to admission.       Current Outpatient Prescriptions   Medication Sig Dispense Refill     acetaminophen (TYLENOL) 500 MG tablet Take 500-1,000 mg by mouth       Multiple Vitamin (THERAPEUTIC MULTIVITAMIN PO)          Physical Exam:  Vitals: There were no vitals taken for this visit.  BMI= There is no height or weight on file to calculate BMI.    Labs:  UPT: No results found for: HCGQUANT      BMP:  No results for input(s): NA, POTASSIUM, CHLORIDE, CO2, BUN, CR, GLC, REBA in the last 11741 hours.  CBC:   No results for input(s): WBC, RBC, HGB, HCT, MCV, MCH, MCHC, RDW, PLT in the last 88416 hours.  Coags:  No results for input(s): INR, PTT, FIBR in the last 29283 hours.      Trevor Tipton DO, MSc. (CA-1)    4/12/2018.  10:35 PM                        .

## 2018-04-13 NOTE — BRIEF OP NOTE
[facility] [place]    Brief Operative Note    Pre-operative diagnosis: Bladder Cancer   Post-operative diagnosis Bladder Cancer  Procedure: Procedure(s):  Cystoscopy, Bladder Biopsy with Cysview, Fulguration - Wound Class: II-Clean Contaminated  Surgeon: Surgeon(s) and Role:     * Conner Butt MD - Primary     * Cristobal Barragan MD - Resident - Assisting  Anesthesia: General   Estimated blood loss: Less than 10 ml  Drains: None  Specimens:   ID Type Source Tests Collected by Time Destination   A : dome bladder biopsy Tissue Bladder SURGICAL PATHOLOGY EXAM Conner Butt MD 4/13/2018  2:21 PM      Findings:   Calcification in the posterior left dome of the bladder, biopsied and fulgurated..  Complications: None.  Implants: None.

## 2018-04-13 NOTE — OR NURSING
Dr. Azeb Mcdaniels, with anesthesia, notified that patient has met criteria and is ready for phase two. Dr. Mcdaniels will complete post-op evaluation when time allows.

## 2018-04-13 NOTE — OP NOTE
Procedure Date: 04/13/2018      DATE OF SERVICE:  04/13/2018      PREOPERATIVE DIAGNOSIS:  Suspicious bladder lesion.      POSTOPERATIVE DIAGNOSIS:  Suspicious bladder lesion.      PROCEDURES PERFORMED:   1.  Cystourethroscopy with white and blue light cystoscopy (Cysview)  2.  Transurethral resection of bladder tumor.   3.  Fulguration.      STAFF SURGEON:  Conner Butt MD      RESIDENT:  Cristobal Barragan MD      ANESTHESIA:  General.      ESTIMATED BLOOD LOSS:  10 mL      COMPLICATIONS:  None.      SPECIMENS:  Bladder biopsy from the dome for permanent pathology.      DRAINS:  None.      INDICATIONS FOR PROCEDURE:  Arcadio Thompson is a very pleasant 62-year-old gentleman who presented with a history of high-grade T1 bladder cancer and underwent a resection with Janie Zamarripa MD who presents today for repeat resection of a suspicious area on the bladder dome.  All risks, benefits and alternatives discussed prior to proceeding.      OPERATIVE DETAILS:  Following verification of informed consent, the patient was brought to the operating room and placed supine on the operating room table.  A formal timeout was performed confirming the patients identify and the procedure to be performed, the operative site, and general anesthesia was induced.  The patient was repositioned in dorsal lithotomy.  Genitalia was prepped and draped in standard sterile fashion.  We began by inserting a 22-Haitian rigid cystoscope per well-lubricated urethra.  The entire length of the urethra was unremarkable.  The prostate was 2.5 cm and nonobstructing.  Bladder was completely surveyed with white light initially.  We visualized an area of calcification in the posterior dome of the bladder near the left side.  This appeared to have some surrounding inflammation.  It did mildly enhance under blue light.  A complete white light cystoscopy revealed no other areas of concern.  Both ureteral orifices were orthotopic.  On blue light cystoscopy,  the remainder of the bladder also appeared unremarkable.  We used cold cup biopsy forceps to biopsy the lesion in concern and passed this off the field for permanent pathology.  We then used a Bugbee electrocautery device to fulgurate the biopsy site until hemostasis was verified under low flow and low pressure.  The bladder was then drained.  The patient was awakened from anesthesia and transferred to postanesthesia care unit in stable condition.  There were no immediate complications.         VY FINLEY MD       As dictated by DEVIN HOLLAND MD       I was present and involved in the procedure     D: 2018   T: 2018   MT: GERARD      Name:     LUIZ PINEDA   MRN:      [id number]        Account:        [id number]   :      1955           Procedure Date: 2018      Document: [id number]

## 2018-04-13 NOTE — OR NURSING
Garay placed without problems.. Urine drained of about 50cc Cysview instilled at 12:35.. Garay removed Pt tolerated well.

## 2018-04-13 NOTE — DISCHARGE INSTRUCTIONS
REMEMBER: SAME DAY SURGERY IS NOT SAME DAY RECOVERY. TAKE IT EASY, GET PLENTY OF REST, AND HAVE SOMEONE WITH YOU FOR 24 HOURS. FOLLOW THESE INSTRUCTIONS AND PLEASE DO NOT HESITATE TO CALL WITH ANY QUESTIONS.  [facility], [place]  Same-Day Surgery   Adult Discharge Orders & Instructions     For 24 hours after surgery    1. Get plenty of rest.  A responsible adult must stay with you for at least 24 hours after you leave the hospital.   2. Do not drive or use heavy equipment.  If you have weakness or tingling, don't drive or use heavy equipment until this feeling goes away.  3. Do not drink alcohol.  4. Avoid strenuous or risky activities.  Ask for help when climbing stairs.   5. You may feel lightheaded.  IF so, sit for a few minutes before standing.  Have someone help you get up.   6. If you have nausea (feel sick to your stomach): Drink only clear liquids such as apple juice, ginger ale, broth or 7-Up.  Rest may also help.  Be sure to drink enough fluids.  Move to a regular diet as you feel able.  7. You may have a slight fever. Call the doctor if your fever is over 100 F (37.7 C) (taken under the tongue) or lasts longer than 24 hours.  8. You may have a dry mouth, a sore throat, muscle aches or trouble sleeping.  These should go away after 24 hours.  9. Do not make important or legal decisions.   Call your doctor for any of the followin.  Signs of infection (fever, growing tenderness at the surgery site, a large amount of drainage or bleeding, severe pain, foul-smelling drainage, redness, swelling).    2. It has been over 8 to 10 hours since surgery and you are still not able to urinate (pass water).    3.  Headache for over 24 hours.    To contact a doctor, call Dr Butt's office at [phone], 8 am to 4:30 pm or:        [phone] and ask for the resident on call for Urology (answered 24 hours a day)      Emergency Department:    [facility]: [phone]       (TTY  for hearing impaired: [phone])

## 2018-04-16 ENCOUNTER — CARE COORDINATION (OUTPATIENT)
Dept: UROLOGY | Facility: CLINIC | Age: 63
End: 2018-04-16

## 2018-04-16 LAB — COPATH REPORT: NORMAL

## 2018-04-16 NOTE — PROGRESS NOTES
Called and left message for patient. Calling to see how he is after recent procedure. Encouraged to call back with any questions or concerns.

## 2018-05-01 ENCOUNTER — PRE VISIT (OUTPATIENT)
Dept: UROLOGY | Facility: CLINIC | Age: 63
End: 2018-05-01

## 2018-05-01 NOTE — TELEPHONE ENCOUNTER
Reason for visit: Post op     Relevant information: bladder biopsy    Records/imaging/labs: all records available    Pt called: No need for a call    Rooming: Regular

## 2018-05-09 ENCOUNTER — OFFICE VISIT (OUTPATIENT)
Dept: UROLOGY | Facility: CLINIC | Age: 63
End: 2018-05-09
Payer: COMMERCIAL

## 2018-05-09 VITALS
BODY MASS INDEX: 28.52 KG/M2 | DIASTOLIC BLOOD PRESSURE: 85 MMHG | HEART RATE: 56 BPM | HEIGHT: 65 IN | WEIGHT: 171.2 LBS | SYSTOLIC BLOOD PRESSURE: 138 MMHG

## 2018-05-09 DIAGNOSIS — C67.8 MALIGNANT NEOPLASM OF OVERLAPPING SITES OF BLADDER (H): Primary | ICD-10-CM

## 2018-05-09 ASSESSMENT — PAIN SCALES - GENERAL: PAINLEVEL: NO PAIN (0)

## 2018-05-09 NOTE — PATIENT INSTRUCTIONS
Schedule BCG.    It was a pleasure meeting with you today.  Thank you for allowing me and my team the privilege of caring for you today.  YOU are the reason we are here, and I truly hope we provided you with the excellent service you deserve.  Please let us know if there is anything else we can do for you so that we can be sure you are leaving completely satisfied with your care experience.

## 2018-05-09 NOTE — MR AVS SNAPSHOT
After Visit Summary   5/9/2018    Arcadio Thompson    MRN: [id number]           Patient Information     Date Of Birth          1955        Visit Information        Provider Department      5/9/2018 2:00 PM Conner Butt MD [facility] and [facility]        Today's Diagnoses     Malignant neoplasm of overlapping sites of bladder (H)    -  1      Care Instructions    Schedule BCG.    It was a pleasure meeting with you today.  Thank you for allowing me and my team the privilege of caring for you today.  YOU are the reason we are here, and I truly hope we provided you with the excellent service you deserve.  Please let us know if there is anything else we can do for you so that we can be sure you are leaving completely satisfied with your care experience.                  Follow-ups after your visit        Your next 10 appointments already scheduled     Jun 12, 2018  2:00 PM CDT   Infusion 120 with UC ONCOLOGY INFUSION, UC 18 ATC, UC BED 3 ATC   [facility] [place] [facility]    [address] [place] [address]  [address]  [facility] [address] [phone]            Jun 19, 2018  2:00 PM CDT   Infusion 120 with UC ONCOLOGY INFUSION, UC 14 ATC, UC BED 3 ATC   [facility] [place] [facility]    [address]  [address]  [facility] [address] [phone]            Jun 26, 2018  2:00 PM CDT   Infusion 120 with UC ONCOLOGY INFUSION, UC 11 ATC, UC BED 3 ATC   [facility] [place] [facility]    [address] [place] [address]  [address]  [facility] [address] [phone]              Who to contact     Please call your clinic at [phone] to:    Ask questions about your health    Make or cancel appointments    Discuss your medicines    Learn about your test results    Speak to your doctor            Additional Information About Your Visit       "  MyChart Information     OpenLabel is an electronic gateway that provides easy, online access to your medical records. With OpenLabel, you can request a clinic appointment, read your test results, renew a prescription or communicate with your care team.     To sign up for OpenLabel visit the website at [email or web address]   You will be asked to enter the access code listed below, as well as some personal information. Please follow the directions to create your username and password.     Your access code is: [email or web address]  Expires: 6/10/2018  6:30 AM     Your access code will  in 90 days. If you need help or a new code, please contact your [facility] or call [phone] for assistance.        Care EveryWhere ID     This is your Care EveryWhere ID. This could be used by other organizations to access your [facility] medical records  [id number]        Your Vitals Were     Pulse Height BMI (Body Mass Index)             56 1.651 m (5' 5\") 28.49 kg/m2          Blood Pressure from Last 3 Encounters:   18 138/85   18 (!) 150/92   18 (!) 146/101    Weight from Last 3 Encounters:   18 77.7 kg (171 lb 3.2 oz)   18 77.2 kg (170 lb 3.1 oz)   18 77.1 kg (170 lb)              Today, you had the following     No orders found for display       Primary Care Provider Office Phone # Fax #    Katie D [facility] [phone]       [address] [facility] [address]        Equal Access to Services     OVI VILLALTA : Hadii pillo wilkins Somarlen, waaxda luqadaha, qaybta kaalmamaya sanderson. So [facility] [phone].    ATENCIÓN: Si habla español, tiene a castillo disposición servicios gratuitos de asistencia lingüística. Pkame al [phone].    We comply with applicable federal civil rights laws and [place] laws. We do not discriminate on the basis of race, color, national origin, age, disability, " sex, sexual orientation, or gender identity.            Thank you!     Thank you for choosing [facility] AND [facility] PROST[facility] UROLOGIC CANCERS  for your care. Our goal is always to provide you with excellent care. Hearing back from our patients is one way we can continue to improve our services. Please take a few minutes to complete the written survey that you may receive in the mail after your visit with us. Thank you!             Your Updated Medication List - Protect others around you: Learn how to safely use, store and throw away your medicines at www.disposemymeds.org.          This list is accurate as of 5/9/18 11:59 PM.  Always use your most recent med list.                   Brand Name Dispense Instructions for use Diagnosis    acetaminophen 500 MG tablet    TYLENOL     Take 500-1,000 mg by mouth        phenazopyridine 97.5 MG tablet    AZO    12 tablet    Take 2 tablets (195 mg) by mouth 3 times daily    Malignant neoplasm of overlapping sites of bladder (H)       THERAPEUTIC MULTIVITAMIN PO

## 2018-05-09 NOTE — PROGRESS NOTES
"Reason for visit:  Follow up bladder cancer    HPI: Arcadio Thompson is a 62 year old male urologic history of HGT1 bladder cancer who presents with new finding of ulcer and extrinsic appearing mass on recent cystoscopy.  He initially presented with gross hematuria to Dr. Zamarripa in June 2017.  He underwent TURBT and was found to have HGT1 baldder cancer and received intravesical mytomycin post-op.  Restaging TURBT confirmed HGT1 disease.  He was initiated on BCG induction course and completed that without issue.  On initial surveillance cystoscopy, he was noted to have an ulcer at the bladder dome and non-papillary appearing deformity that was thought to be due to an extravesical compression/mass.  He underwent CTU, which showed at mass arising from the anterolateral left aspect of the bladder.    He underwent Urovysion cytology which was negative. He was subsequently scheduled for cold cup biopsy of the suspicious appearing bladder dome mass, pathology reveals:    FINAL DIAGNOSIS:   Bladder, dome; biopsy:   - Chronic inflammation and calcification with reactive changes   - Unremarkable fragment of urothelium   - Negative for malignancy     Current meds    Current Outpatient Prescriptions   Medication Sig Dispense Refill     acetaminophen (TYLENOL) 500 MG tablet Take 500-1,000 mg by mouth       Multiple Vitamin (THERAPEUTIC MULTIVITAMIN PO)        phenazopyridine (AZO) 97.5 MG tablet Take 2 tablets (195 mg) by mouth 3 times daily 12 tablet 0     OBJECTIVE:  /85  Pulse 56  Ht 1.651 m (5' 5\")  Wt 77.7 kg (171 lb 3.2 oz)  BMI 28.49 kg/m2    EXAM:  GENERAL: No acute distress. Well nourished.   HEENT:  Sclerae anicteric.  Conjunctivae pink.  Moist mucous membranes.  LUNGS:  Non-labored breathing.  ABDOMEN: Soft, non-tender,no organomegaly, non-distended  SKIN: No rashes.  Dry.     EXTREMITIES:  Warm, well perfused.  No Lower extremity edema bilaterally.  NEURO: normal gait, no focal deficits.     Labs/imaging: " relevant results as above    ASSESSMENT: Luiz Thompson is a 62 year old male with history of HGT1, with restaging TURBTs without evidence of further disease but notable for ulceration of the bladder dome.  He completed induction BCG in 10/2017.  Rebiopsy of the site is negative for malignancy and consistent with changes associated with intravesical treatment.    PLAN:   - Recommend continued cares per his primary Urologist  - Recommend waiting 2-4 weeks prior to initiation of maintenance BCG and subsequent surveillance cystoscopy    Rony Garland, WILLARD  Urology resident    Patient seen and plan discussed with Dr. Butt    Patient seen and examined with the resident.  Visit time 15 minutes and >50% spent in counseling.  I agree with the resident's note and plan of care.       Conner Butt MD  Urology Staff      CC  Patient Care Team:  Katie Jackson as PCP - General (Nurse Practitioner)  Nieves Roberson, RN as Registered Nurse (Urology)  KATIE JACKSON    Copy to patient  LUIZ THOMPSON  [address]  [facility] [address]

## 2018-05-09 NOTE — LETTER
"5/9/2018     RE: Arcadio Thompson  [address]  [facility] [address]     Dear Colleague,    Thank you for referring your patient, Arcadio Thompson, to the [facility] UROLOGY AND [facility] FOR PROSTATE AND UROLOGIC CANCERS at [facility]. Please see a copy of my visit note below.    Reason for visit:  Follow up bladder cancer    HPI: Arcadio Thompson is a 62 year old male urologic history of HGT1 bladder cancer who presents with new finding of ulcer and extrinsic appearing mass on recent cystoscopy.  He initially presented with gross hematuria to Dr. Zamarripa in June 2017.  He underwent TURBT and was found to have HGT1 baldder cancer and received intravesical mytomycin post-op.  Restaging TURBT confirmed HGT1 disease.  He was initiated on BCG induction course and completed that without issue.  On initial surveillance cystoscopy, he was noted to have an ulcer at the bladder dome and non-papillary appearing deformity that was thought to be due to an extravesical compression/mass.  He underwent CTU, which showed at mass arising from the anterolateral left aspect of the bladder.    He underwent Urovysion cytology which was negative. He was subsequently scheduled for cold cup biopsy of the suspicious appearing bladder dome mass, pathology reveals:    FINAL DIAGNOSIS:   Bladder, dome; biopsy:   - Chronic inflammation and calcification with reactive changes   - Unremarkable fragment of urothelium   - Negative for malignancy     Current meds    Current Outpatient Prescriptions   Medication Sig Dispense Refill     acetaminophen (TYLENOL) 500 MG tablet Take 500-1,000 mg by mouth       Multiple Vitamin (THERAPEUTIC MULTIVITAMIN PO)        phenazopyridine (AZO) 97.5 MG tablet Take 2 tablets (195 mg) by mouth 3 times daily 12 tablet 0     OBJECTIVE:  /85  Pulse 56  Ht 1.651 m (5' 5\")  Wt 77.7 kg (171 lb 3.2 oz)  BMI 28.49 kg/m2    EXAM:  GENERAL: No acute distress. Well nourished. "   HEENT:  Sclerae anicteric.  Conjunctivae pink.  Moist mucous membranes.  LUNGS:  Non-labored breathing.  ABDOMEN: Soft, non-tender,no organomegaly, non-distended  SKIN: No rashes.  Dry.     EXTREMITIES:  Warm, well perfused.  No Lower extremity edema bilaterally.  NEURO: normal gait, no focal deficits.     Labs/imaging: relevant results as above    ASSESSMENT: Luiz Thompson is a 62 year old male with history of HGT1, with restaging TURBTs without evidence of further disease but notable for ulceration of the bladder dome.  He completed induction BCG in 10/2017.  Rebiopsy of the site is negative for malignancy and consistent with changes associated with intravesical treatment.    PLAN:   - Recommend continued cares per his primary Urologist  - Recommend waiting 2-4 weeks prior to initiation of maintenance BCG and subsequent surveillance cystoscopy    WILLARD Aguirre  Urology resident    Patient seen and plan discussed with Dr. Butt    Patient seen and examined with the resident.  Visit time 15 minutes and >50% spent in counseling.  I agree with the resident's note and plan of care.       Conner Butt MD  Urology Staff    CC  Patient Care Team:  Katie Jackson as PCP - General (Nurse Practitioner)  Nieves Roberson, RN as Registered Nurse (Urology)  KATIE JACKSON    Copy to patient  LUIZ THOMPSON  [address]  [facility] [address]

## 2018-05-30 ENCOUNTER — TELEPHONE (OUTPATIENT)
Dept: UROLOGY | Facility: CLINIC | Age: 63
End: 2018-05-30

## 2018-05-30 NOTE — TELEPHONE ENCOUNTER
----- Message from Nieves Roberson RN sent at 5/29/2018  3:53 PM CDT -----  Kim,  Can you call and give him appt dates and time?  Thank you  ----- Message -----     From: Feli Ortiz     Sent: 5/16/2018   2:52 PM       To: Nieves Roberson RN    All scheduled!    ----- Message -----     From: Nieves Roberson RN     Sent: 5/16/2018   2:49 PM       To: Feli Ortiz    This patient needs to be set up for 3 BCG's starting in 4 weeks.  Thank you

## 2018-05-30 NOTE — TELEPHONE ENCOUNTER
I spoke with Arcadio, he states he is having his BCG treatments in [place] with his local urologist, Dr. Zamarripa.      [facility] and Dr. Butt notified.

## 2021-08-25 NOTE — TELEPHONE ENCOUNTER
New patient consult for bladder cancer.  Patient of Dr. Zamarripa.  Records scanned into EPIC.  
[FreeTextEntry8] : Pt c/o 2 mos of blood with BMs when wiping. Pt has pain after wiping and has 8/10 pain sitting down. Denies pain w/ passing a BM. Pt is constipated and strains often when passing BMs. Pt has lost 30 lbs intentionally in past few months doing weight watchers. \par \par Pt had COVID infx 12/2020

## (undated) DEVICE — PACK CYSTO UMMC CUSTOM

## (undated) DEVICE — SOL NACL 0.9% IRRIG 3000ML BAG 2B7477

## (undated) DEVICE — LINEN TOWEL PACK X5 5464

## (undated) DEVICE — ESU GROUND PAD ADULT W/CORD E7507

## (undated) DEVICE — NDL BLUNT 18GA 1" W/O FILTER 305181

## (undated) DEVICE — PACK GOWN 3/PK DISP XL SBA32GPFCB

## (undated) DEVICE — SOL WATER IRRIG 3000ML BAG 2B7117

## (undated) DEVICE — SUCTION MANIFOLD DORNOCH ULTRA CART UL-CL500

## (undated) DEVICE — DRSG TELFA 3X8" 1238

## (undated) DEVICE — PAD CHUX UNDERPAD 23X24" 7136

## (undated) RX ORDER — CEFAZOLIN SODIUM 2 G/100ML
INJECTION, SOLUTION INTRAVENOUS
Status: DISPENSED
Start: 2018-04-13

## (undated) RX ORDER — FENTANYL CITRATE 50 UG/ML
INJECTION, SOLUTION INTRAMUSCULAR; INTRAVENOUS
Status: DISPENSED
Start: 2018-04-13

## (undated) RX ORDER — GABAPENTIN 300 MG/1
CAPSULE ORAL
Status: DISPENSED
Start: 2018-04-13

## (undated) RX ORDER — ACETAMINOPHEN 325 MG/1
TABLET ORAL
Status: DISPENSED
Start: 2018-04-13